# Patient Record
Sex: MALE | Race: WHITE | Employment: FULL TIME | URBAN - METROPOLITAN AREA
[De-identification: names, ages, dates, MRNs, and addresses within clinical notes are randomized per-mention and may not be internally consistent; named-entity substitution may affect disease eponyms.]

---

## 2020-06-21 ENCOUNTER — HOSPITAL ENCOUNTER (OUTPATIENT)
Age: 50
Discharge: HOME | End: 2020-06-21
Attending: FAMILY MEDICINE
Payer: COMMERCIAL

## 2020-06-21 ENCOUNTER — APPOINTMENT (OUTPATIENT)
Dept: GENERAL RADIOLOGY | Age: 50
End: 2020-06-21
Attending: FAMILY MEDICINE
Payer: COMMERCIAL

## 2020-06-21 VITALS
HEART RATE: 80 BPM | RESPIRATION RATE: 18 BRPM | DIASTOLIC BLOOD PRESSURE: 82 MMHG | TEMPERATURE: 98 F | SYSTOLIC BLOOD PRESSURE: 140 MMHG | OXYGEN SATURATION: 97 %

## 2020-06-21 DIAGNOSIS — S91.331A PUNCTURE WOUND OF RIGHT FOOT, INITIAL ENCOUNTER: Primary | ICD-10-CM

## 2020-06-21 PROCEDURE — 99203 OFFICE O/P NEW LOW 30 MIN: CPT

## 2020-06-21 PROCEDURE — 90471 IMMUNIZATION ADMIN: CPT

## 2020-06-21 PROCEDURE — 73630 X-RAY EXAM OF FOOT: CPT | Performed by: FAMILY MEDICINE

## 2020-06-21 RX ORDER — CEPHALEXIN 500 MG/1
500 CAPSULE ORAL 2 TIMES DAILY
Qty: 14 CAPSULE | Refills: 0 | Status: SHIPPED | OUTPATIENT
Start: 2020-06-21

## 2020-06-21 NOTE — ED INITIAL ASSESSMENT (HPI)
Pt stepped on a addi nail yesterday at work, and needs a TDAP.   Wound is not red or infected at this time

## 2020-06-21 NOTE — ED PROVIDER NOTES
Patient Seen in: [facility]      History   Patient presents with:  Trauma    Stated Complaint: Right foot injury x 1 day    HPI    This 69-year-old male presents the office with complaint of puncture wound to the sole of his rig cyanosis, edema noted. On the plantar surface of the right foot there is a 2 mm puncture wound in the area of the calcaneus. It is tender to the touch but there is no redness or purulent drainage noted. CMS is intact to the right foot.   Normal range of worsening symptoms.